# Patient Record
Sex: MALE | Race: AMERICAN INDIAN OR ALASKA NATIVE | NOT HISPANIC OR LATINO
[De-identification: names, ages, dates, MRNs, and addresses within clinical notes are randomized per-mention and may not be internally consistent; named-entity substitution may affect disease eponyms.]

---

## 2019-06-10 ENCOUNTER — RECORDS - HEALTHEAST (OUTPATIENT)
Dept: ADMINISTRATIVE | Facility: OTHER | Age: 74
End: 2019-06-10

## 2019-06-20 ENCOUNTER — AMBULATORY - HEALTHEAST (OUTPATIENT)
Dept: ADMINISTRATIVE | Facility: CLINIC | Age: 74
End: 2019-06-20

## 2019-06-20 ENCOUNTER — RECORDS - HEALTHEAST (OUTPATIENT)
Dept: ADMINISTRATIVE | Facility: OTHER | Age: 74
End: 2019-06-20

## 2019-06-20 DIAGNOSIS — B35.1 TINEA UNGUIUM: ICD-10-CM

## 2019-06-20 DIAGNOSIS — L60.0 INGROWN RIGHT GREATER TOENAIL: ICD-10-CM

## 2019-07-16 ENCOUNTER — OFFICE VISIT - HEALTHEAST (OUTPATIENT)
Dept: PODIATRY | Facility: CLINIC | Age: 74
End: 2019-07-16

## 2019-07-16 DIAGNOSIS — L60.0 INGROWN NAIL: ICD-10-CM

## 2021-05-30 NOTE — PROGRESS NOTES
FOOT AND ANKLE SURGERY/PODIATRY CONSULT NOTE         ASSESSMENT:   Ingrown Nail      TREATMENT:  -Patient complains of an ingrown nail on both great toes. On exam, there is mild incurvation on the lateral border bilateral hallux. No signs of infection. Very mild pain on the right hallux today.     -I discussed both temporary and permanent nail avulsion procedures including post-op course. Because his symptoms are very mild at this time, I recommend he avoid a procedure today and continue to monitor. Patient agrees with the treatment plan.    -He will follow-up with me if symptoms seem to worsen and we will likely proceed with a temporary nail avulsion procedure.     Salomón Lopez DPM  [facility]      HPI: I was asked to see Nikos Werner today for painful ingrown nails on both great toes. The patient relates first noticed pain 2 months ago along with drainage. He was able to soak the toes which reduced the drainage. Currently doing well, no significant pain at this time.     Past Medical History:   Diagnosis Date     Anemia      Aortic valve stenosis      Astigmatism      B12 deficiency      Back pain      Cataract      Chronic kidney failure      Cirrhosis (H)      Depression      Diabetes mellitus (H)      DJD (degenerative joint disease)      Gross hematuria      Hemorrhage of esophageal varices in alcoholic cirrhosis (H)      Hepatic encephalopathy (H)      Hyperlipidemia      Hypermetropia      Hypertension      Impotence      Insomnia      Knee pain      Left ventricular hypertrophy      Low back pain      Neuropathy with IgA monoclonal gammopathy (H)      Obesity      Onychocryptosis      Osteoarthritis      Posterior vitreous detachment      Preglaucoma      Presbyopia      Pulmonary nodule      Shoulder pain      Sleep apnea      Squamous cell carcinoma      Squamous cell carcinoma      Substance abuse (H)      Vitamin D deficiency        Past Surgical History:    Procedure Laterality Date     GASTRIC BYPASS       TRANSPLANTATION RENAL  1988       No Known Allergies      Current Outpatient Medications:      acamprosate (CAMPRAL) 333 mg tablet, Take 666 mg by mouth 3 (three) times a day., Disp: , Rfl:      acetaminophen (TYLENOL) 325 MG tablet, Take 325-650 mg by mouth., Disp: , Rfl:      azaTHIOprine (IMURAN) 50 mg tablet, Take 50 mg by mouth., Disp: , Rfl:      buPROPion (WELLBUTRIN SR) 150 MG 12 hr tablet, Take 150 mg by mouth., Disp: , Rfl:      cholecalciferol, vitamin D3, 10 mcg/mL (400 unit/mL) Drop drops, Take by mouth., Disp: , Rfl:      cyanocobalamin, vitamin B-12, (CYANOCOBALAMIN,VIT B-12,,BULK,) Powd, Take by mouth., Disp: , Rfl:      lidocaine (XYLOCAINE) 5 % ointment, Apply topically., Disp: , Rfl:      multivitamin (ONE A DAY), Take by mouth., Disp: , Rfl:      predniSONE (DELTASONE) 5 MG tablet, Take by mouth., Disp: , Rfl:      traZODone (DESYREL) 50 MG tablet, Take by mouth., Disp: , Rfl:     History reviewed. No pertinent family history.    Social History     Socioeconomic History     Marital status:      Spouse name: Not on file     Number of children: Not on file     Years of education: Not on file     Highest education level: Not on file   Occupational History     Not on file   Social Needs     Financial resource strain: Not on file     Food insecurity:     Worry: Not on file     Inability: Not on file     Transportation needs:     Medical: Not on file     Non-medical: Not on file   Tobacco Use     Smoking status: Never Smoker     Smokeless tobacco: Never Used   Substance and Sexual Activity     Alcohol use: Not Currently     Comment: Recovering Alcoholic     Drug use: Not on file     Sexual activity: Not on file   Lifestyle     Physical activity:     Days per week: Not on file     Minutes per session: Not on file     Stress: Not on file   Relationships     Social connections:     Talks on phone: Not on file     Gets together: Not on file      Attends Rastafarian service: Not on file     Active member of club or organization: Not on file     Attends meetings of clubs or organizations: Not on file     Relationship status: Not on file     Intimate partner violence:     Fear of current or ex partner: Not on file     Emotionally abused: Not on file     Physically abused: Not on file     Forced sexual activity: Not on file   Other Topics Concern     Not on file   Social History Narrative     Not on file       Review of Systems - Patient denies fever, chills, rash, wound, stiffness, limping, numbness, weakness, heart burn, blood in stool, chest pain with activity, calf pain when walking, shortness of breath with activity, chronic cough, easy bleeding/bruising, swelling of ankles, excessive thirst, fatigue, depression, anxiety.      OBJECTIVE:  Appearance: alert, well appearing, and in no distress.    Vitals:    07/16/19 0950   BP: 124/58   Pulse: 69   Temp: 98.7  F (37.1  C)       BMI= There is no height or weight on file to calculate BMI.    General appearance: Patient is alert and fully cooperative with history & exam.  No sign of distress is noted during the visit.     Psychiatric: Affect is pleasant & appropriate.  Patient appears motivated to improve health.     Respiratory: Breathing is regular & unlabored while sitting.     HEENT: Hearing is intact to spoken word.  Speech is clear.  No gross evidence of visual impairment that would impact ambulation.      Vascular: Dorsalis pedis and posterior tibial pulses are palpable. There is pedal hair growth bilateral.  CFT < 3 sec from anterior tibial surface to distal digits bilateral. There is no appreciable edema noted.  Dermatologic: Turgor and texture are within normal limits. No coloration or temperature changes. No primary or secondary lesions noted. Mild incurvation lateral border hallux bilateral.   Neurologic: All epicritic and proprioceptive sensations are grossly intact bilateral.  Musculoskeletal: Mild  pain lateral border right hallux.     Imaging:     No results found.